# Patient Record
Sex: FEMALE | Race: WHITE
[De-identification: names, ages, dates, MRNs, and addresses within clinical notes are randomized per-mention and may not be internally consistent; named-entity substitution may affect disease eponyms.]

---

## 2018-01-02 ENCOUNTER — HOSPITAL ENCOUNTER (OUTPATIENT)
Dept: HOSPITAL 68 - STS | Age: 33
End: 2018-01-02
Attending: SURGERY
Payer: COMMERCIAL

## 2018-01-02 VITALS — HEIGHT: 63 IN | WEIGHT: 165 LBS | BODY MASS INDEX: 29.23 KG/M2

## 2018-01-02 DIAGNOSIS — M54.2: ICD-10-CM

## 2018-01-02 DIAGNOSIS — D21.0: Primary | ICD-10-CM

## 2018-01-02 DIAGNOSIS — K21.9: ICD-10-CM

## 2018-01-04 NOTE — OPERATIVE REPORT
Operative/Inv Procedure Report
Surgery Date: 01/02/18
Name of Procedure:
excision of deep, subfascial posterior neck lipoma
Pre-Operative Diagnosis:
neck mass
Post-Operative Diagnosis:
same, deep subfascial, 3.2 cm diameter
Estimated Blood Loss: scant
Surgeon/Assistant:
Milind MANCINI,Guillermo CASTELLANOS
 
Anesthesia: general endotracheal tube
 
Operative/Procedure Note
Note:
Initially positioned supine, aftr induction of gneral anesthia, repostioned, 
turned over prone, posterior aspect of neck and surrounding area prepped draped 
in usual sterile fashion, after planning incision, following Byron's lines, 
injecting local anesthetic, a 4 cm incision was made. This was deepened through 
SQ layer, then dissected around a fibrotic fatty mass subfascial deep, based 
just lateral to prominence of C7, measuring 2.2 cm diameter. Cautery used for 
hemostasis, area was irrigated and closed in layers using interrupted 2-0 Vicryl
sutures deep followed by running subcuticular 4-0 Biosyn suture for the skin 
itself followed by Steri-Strips Telfa and Tegaderm. EBL minimal lap and sponge 
counts correct wound expectancy clean IV fluids crystalloid complications none 
patient tolerated the procedure well was extubated and returned to recovery room
in satisfactory condition.